# Patient Record
Sex: FEMALE
[De-identification: names, ages, dates, MRNs, and addresses within clinical notes are randomized per-mention and may not be internally consistent; named-entity substitution may affect disease eponyms.]

---

## 2017-01-24 PROBLEM — Z00.00 ENCOUNTER FOR PREVENTIVE HEALTH EXAMINATION: Noted: 2017-01-24

## 2017-02-03 ENCOUNTER — APPOINTMENT (OUTPATIENT)
Dept: HUMAN REPRODUCTION | Facility: CLINIC | Age: 32
End: 2017-02-03

## 2017-04-14 ENCOUNTER — EMERGENCY (EMERGENCY)
Facility: HOSPITAL | Age: 32
LOS: 0 days | Discharge: HOME | End: 2017-04-14
Attending: EMERGENCY MEDICINE
Payer: MEDICAID

## 2017-04-14 VITALS
RESPIRATION RATE: 17 BRPM | OXYGEN SATURATION: 99 % | DIASTOLIC BLOOD PRESSURE: 64 MMHG | SYSTOLIC BLOOD PRESSURE: 112 MMHG | HEART RATE: 99 BPM

## 2017-04-14 VITALS
TEMPERATURE: 98 F | OXYGEN SATURATION: 91 % | DIASTOLIC BLOOD PRESSURE: 96 MMHG | HEIGHT: 67 IN | SYSTOLIC BLOOD PRESSURE: 129 MMHG | RESPIRATION RATE: 16 BRPM | HEART RATE: 101 BPM | WEIGHT: 160.06 LBS

## 2017-04-14 DIAGNOSIS — R06.02 SHORTNESS OF BREATH: ICD-10-CM

## 2017-04-14 DIAGNOSIS — T78.40XA ALLERGY, UNSPECIFIED, INITIAL ENCOUNTER: ICD-10-CM
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
## 2017-04-14 DIAGNOSIS — Z98.51 TUBAL LIGATION STATUS: Chronic | ICD-10-CM

## 2017-04-14 PROCEDURE — 99291 CRITICAL CARE FIRST HOUR: CPT

## 2017-04-14 RX ORDER — IPRATROPIUM/ALBUTEROL SULFATE 18-103MCG
3 AEROSOL WITH ADAPTER (GRAM) INHALATION ONCE
Qty: 0 | Refills: 0 | Status: COMPLETED | OUTPATIENT
Start: 2017-04-14 | End: 2017-04-14

## 2017-04-14 RX ORDER — DIPHENHYDRAMINE HCL 50 MG
50 CAPSULE ORAL ONCE
Qty: 0 | Refills: 0 | Status: COMPLETED | OUTPATIENT
Start: 2017-04-14 | End: 2017-04-14

## 2017-04-14 RX ORDER — DIPHENHYDRAMINE HCL 50 MG
1 CAPSULE ORAL
Qty: 15 | Refills: 0 | OUTPATIENT
Start: 2017-04-14 | End: 2017-04-19

## 2017-04-14 RX ORDER — FAMOTIDINE 10 MG/ML
20 INJECTION INTRAVENOUS ONCE
Qty: 0 | Refills: 0 | Status: COMPLETED | OUTPATIENT
Start: 2017-04-14 | End: 2017-04-14

## 2017-04-14 RX ORDER — DEXAMETHASONE 0.5 MG/5ML
6 ELIXIR ORAL ONCE
Qty: 0 | Refills: 0 | Status: COMPLETED | OUTPATIENT
Start: 2017-04-14 | End: 2017-04-14

## 2017-04-14 RX ORDER — DIPHENHYDRAMINE HCL 50 MG
50 CAPSULE ORAL ONCE
Qty: 0 | Refills: 0 | Status: DISCONTINUED | OUTPATIENT
Start: 2017-04-14 | End: 2017-04-14

## 2017-04-14 RX ORDER — KETOROLAC TROMETHAMINE 30 MG/ML
30 SYRINGE (ML) INJECTION ONCE
Qty: 0 | Refills: 0 | Status: DISCONTINUED | OUTPATIENT
Start: 2017-04-14 | End: 2017-04-14

## 2017-04-14 RX ORDER — EPINEPHRINE 0.3 MG/.3ML
0.3 INJECTION INTRAMUSCULAR; SUBCUTANEOUS ONCE
Qty: 0 | Refills: 0 | Status: COMPLETED | OUTPATIENT
Start: 2017-04-14 | End: 2017-04-14

## 2017-04-14 RX ADMIN — Medication 3 MILLILITER(S): at 05:05

## 2017-04-14 RX ADMIN — FAMOTIDINE 20 MILLIGRAM(S): 10 INJECTION INTRAVENOUS at 04:55

## 2017-04-14 RX ADMIN — Medication 30 MILLIGRAM(S): at 07:17

## 2017-04-14 RX ADMIN — Medication 3 MILLILITER(S): at 05:23

## 2017-04-14 RX ADMIN — Medication 30 MILLIGRAM(S): at 07:45

## 2017-04-14 RX ADMIN — Medication 50 MILLIGRAM(S): at 10:37

## 2017-04-14 RX ADMIN — Medication 3 MILLILITER(S): at 05:36

## 2017-04-14 RX ADMIN — Medication 6 MILLIGRAM(S): at 13:00

## 2017-04-14 RX ADMIN — Medication 3 MILLILITER(S): at 10:37

## 2017-04-14 RX ADMIN — EPINEPHRINE 0.3 MILLIGRAM(S): 0.3 INJECTION INTRAMUSCULAR; SUBCUTANEOUS at 04:55

## 2017-04-14 RX ADMIN — Medication 125 MILLIGRAM(S): at 04:55

## 2017-04-14 NOTE — ED ADULT TRIAGE NOTE - CHIEF COMPLAINT QUOTE
Patient unable to speak. accompanying family states "she woke up with an ic reaction." Patient with swelling to oropharynx, chest with little air movement.

## 2017-04-14 NOTE — ED ADULT NURSE REASSESSMENT NOTE - NS ED NURSE REASSESS COMMENT FT1
Pt A&Ox4. No distress noted at this time. Received Toradol for generalized pain. Will continue to monitor
Pt feeling better, alert and oriented able to speak full sentences in normal tone. Airway patent, and breathing unlabored and spontaneous
Pt is alert and oriented remains on the monitor. Her respiration is unlabored and airway is patent. She is able to speak and is calm

## 2017-04-14 NOTE — ED PROVIDER NOTE - PROGRESS NOTE DETAILS
Pt improved in ED, CTAB, c/o mild sore throat.  Will given toradol & cont to observe pt otherwise resting well, airway clear, breathing without rhonchi or stridor pt continued observation in ED - as pt states still feeling of throat tightness, otherwise no wheezing/stridor otherwise given additional benadryl dose and decadron.

## 2017-04-14 NOTE — ED PROVIDER NOTE - PHYSICAL EXAMINATION
Gen: Alert, unable to speak;  Head: NC, AT, EOMI, normal lids/conjunctiva;  ENT: normal hearing, patent oropharynx, +mild swelling of uvula;  Neck: supple, no tenderness/meningismus, FROM;  Pulm: decr air movement bilaterally w exp wheeze;  CV: RRR, no M/R/G, +dist pulses;  Abd: soft, NT/ND, +BS, no guarding/rebound tenderness;  Mskel: no edema/erythema/cyanosis;  Skin: no rash;  Neuro: no sensory/motor deficits

## 2017-04-14 NOTE — ED PROVIDER NOTE - MEDICAL DECISION MAKING DETAILS
Pt w severe allergic reaction, improved in ED w meds given.  Pt being observed after dose of epi.  Patient care transitioned to incoming team.  All decisions regarding the progression of care will be made at their discretion. Pt w severe allergic reaction, improved in ED w meds given.  Pt being observed after dose of epi.  Patient care transitioned to incoming team.  All decisions regarding the progression of care will be made at their discretion.    Pt improved to dc with benadryl otherwise well appearing and with symptoms resolving at discharge will dc with benadryl as additional decadron given for swelling.

## 2017-04-14 NOTE — ED PROVIDER NOTE - OBJECTIVE STATEMENT
Pertinent PMH/PSH/FHx/SHx and Review of Systems contained within:    32yo F w PMH of seasonal allergies presents to ED c/o SOB & allergic reaction.  Family states pt has been having allergies due to weather change. Pt woke up from sleep w sensation throat was swollen & SOB.  Pt drank liquid benadryl PTA.    No fever/chills, No photophobia/eye pain/changes in vision, No ear pain, No chest pain/palpitations, +SOB, No abdominal pain, no dysuria/frequency/discharge, No neck/back pain, no rash, no changes in neurological status/function.

## 2017-04-14 NOTE — ED ADULT NURSE NOTE - OBJECTIVE STATEMENT
pt rushed in by for cousin with c/o sob , unable to speak and angiodema , minimal air movement s/p allergic reaction to something, medicated with epinephrine,pepcid, solumedrol and duoneb ongoing as per md's order ,placed on a monitor, vss, close monitoring in progress